# Patient Record
Sex: FEMALE | Race: WHITE | NOT HISPANIC OR LATINO | Employment: OTHER | URBAN - METROPOLITAN AREA
[De-identification: names, ages, dates, MRNs, and addresses within clinical notes are randomized per-mention and may not be internally consistent; named-entity substitution may affect disease eponyms.]

---

## 2017-04-27 PROBLEM — G89.29 CHRONIC NECK PAIN: Status: ACTIVE | Noted: 2017-04-27

## 2017-04-27 PROBLEM — M54.2 CHRONIC NECK PAIN: Status: ACTIVE | Noted: 2017-04-27

## 2017-07-19 ENCOUNTER — CLINICAL SUPPORT (OUTPATIENT)
Dept: SMOKING CESSATION | Facility: CLINIC | Age: 62
End: 2017-07-19
Payer: COMMERCIAL

## 2017-07-19 DIAGNOSIS — F17.200 NICOTINE DEPENDENCE: Primary | ICD-10-CM

## 2017-07-19 PROCEDURE — 99407 BEHAV CHNG SMOKING > 10 MIN: CPT | Mod: S$GLB,,,

## 2019-02-14 PROBLEM — R93.89 ABNORMAL MRI: Status: ACTIVE | Noted: 2019-02-14

## 2019-08-23 PROBLEM — M25.619 LIMITED RANGE OF MOTION (ROM) OF SHOULDER: Status: ACTIVE | Noted: 2019-08-23

## 2019-08-23 PROBLEM — R20.2 PARESTHESIA OF LEFT ARM: Status: ACTIVE | Noted: 2019-08-23

## 2019-08-23 PROBLEM — M79.2 RADICULAR PAIN IN LEFT ARM: Status: ACTIVE | Noted: 2019-08-23

## 2019-08-23 PROBLEM — R29.3 ABNORMAL POSTURE: Status: ACTIVE | Noted: 2019-08-23

## 2019-08-23 PROBLEM — M53.82 DECREASED ROM OF INTERVERTEBRAL DISCS OF CERVICAL SPINE: Status: ACTIVE | Noted: 2019-08-23

## 2019-08-23 PROBLEM — Z74.09 IMPAIRED MOBILITY AND ADLS: Status: ACTIVE | Noted: 2019-08-23

## 2019-08-23 PROBLEM — M62.81 MUSCLE WEAKNESS: Status: ACTIVE | Noted: 2019-08-23

## 2019-08-23 PROBLEM — M25.522 ELBOW PAIN, CHRONIC, LEFT: Status: ACTIVE | Noted: 2019-08-23

## 2019-08-23 PROBLEM — G89.29 ELBOW PAIN, CHRONIC, LEFT: Status: ACTIVE | Noted: 2019-08-23

## 2019-08-23 PROBLEM — Z78.9 IMPAIRED MOBILITY AND ADLS: Status: ACTIVE | Noted: 2019-08-23

## 2021-05-06 ENCOUNTER — PATIENT MESSAGE (OUTPATIENT)
Dept: RESEARCH | Facility: HOSPITAL | Age: 66
End: 2021-05-06

## 2024-01-09 PROBLEM — M54.12 CERVICAL RADICULOPATHY: Status: ACTIVE | Noted: 2024-01-09

## 2024-04-29 PROBLEM — R13.12 OROPHARYNGEAL DYSPHAGIA: Status: ACTIVE | Noted: 2024-04-29

## 2024-05-02 PROBLEM — F41.9 ANXIETY: Status: ACTIVE | Noted: 2024-05-02

## 2024-05-02 PROBLEM — R03.0 ELEVATED BLOOD PRESSURE READING: Status: ACTIVE | Noted: 2024-05-02

## 2024-05-14 PROBLEM — I10 PRIMARY HYPERTENSION: Chronic | Status: ACTIVE | Noted: 2024-05-14

## 2024-05-15 PROBLEM — H53.2 DIPLOPIA: Status: ACTIVE | Noted: 2024-05-15

## 2024-05-15 PROBLEM — I63.9 CVA (CEREBRAL VASCULAR ACCIDENT): Status: ACTIVE | Noted: 2024-05-15

## 2024-07-25 ENCOUNTER — HOSPITAL ENCOUNTER (EMERGENCY)
Facility: HOSPITAL | Age: 69
Discharge: HOME | End: 2024-07-25
Attending: EMERGENCY MEDICINE
Payer: MEDICARE

## 2024-07-25 VITALS
BODY MASS INDEX: 20.66 KG/M2 | HEART RATE: 62 BPM | OXYGEN SATURATION: 98 % | WEIGHT: 124 LBS | SYSTOLIC BLOOD PRESSURE: 162 MMHG | DIASTOLIC BLOOD PRESSURE: 94 MMHG | TEMPERATURE: 98 F | RESPIRATION RATE: 14 BRPM | HEIGHT: 65 IN

## 2024-07-25 DIAGNOSIS — Z86.73 HISTORY OF CEREBELLAR STROKE: ICD-10-CM

## 2024-07-25 DIAGNOSIS — R42 SPELL OF DIZZINESS: Primary | ICD-10-CM

## 2024-07-25 LAB
ALBUMIN SERPL BCP-MCNC: 3.9 G/DL (ref 3.5–5.2)
ALP SERPL-CCNC: 132 U/L (ref 55–135)
ALT SERPL W/O P-5'-P-CCNC: 29 U/L (ref 10–44)
ANION GAP SERPL CALC-SCNC: 9 MMOL/L (ref 3–11)
AST SERPL-CCNC: 25 U/L (ref 10–40)
BASOPHILS # BLD AUTO: 0.07 K/UL (ref 0–0.2)
BASOPHILS NFR BLD: 1.2 % (ref 0–1.9)
BILIRUB SERPL-MCNC: 0.5 MG/DL (ref 0.1–1)
BUN SERPL-MCNC: 14 MG/DL (ref 8–23)
CALCIUM SERPL-MCNC: 9.6 MG/DL (ref 8.7–10.5)
CHLORIDE SERPL-SCNC: 103 MMOL/L (ref 95–110)
CHOLEST SERPL-MCNC: 159 MG/DL (ref 120–199)
CHOLEST/HDLC SERPL: 2.2 {RATIO} (ref 2–5)
CO2 SERPL-SCNC: 26 MMOL/L (ref 23–29)
CREAT SERPL-MCNC: 1 MG/DL (ref 0.5–1.4)
DIFFERENTIAL METHOD BLD: ABNORMAL
EOSINOPHIL # BLD AUTO: 0.1 K/UL (ref 0–0.5)
EOSINOPHIL NFR BLD: 1.6 % (ref 0–8)
ERYTHROCYTE [DISTWIDTH] IN BLOOD BY AUTOMATED COUNT: 14.3 % (ref 11.5–14.5)
EST. GFR  (NO RACE VARIABLE): >60 ML/MIN/1.73 M^2
GLUCOSE SERPL-MCNC: 106 MG/DL (ref 70–110)
GLUCOSE SERPL-MCNC: 136 MG/DL (ref 70–110)
HCT VFR BLD AUTO: 38.5 % (ref 37–48.5)
HDLC SERPL-MCNC: 73 MG/DL (ref 40–75)
HDLC SERPL: 45.9 % (ref 20–50)
HGB BLD-MCNC: 13.1 G/DL (ref 12–16)
IMM GRANULOCYTES # BLD AUTO: 0.01 K/UL (ref 0–0.04)
IMM GRANULOCYTES NFR BLD AUTO: 0.2 % (ref 0–0.5)
LDLC SERPL CALC-MCNC: 71.8 MG/DL (ref 63–159)
LYMPHOCYTES # BLD AUTO: 2.3 K/UL (ref 1–4.8)
LYMPHOCYTES NFR BLD: 39.9 % (ref 18–48)
MCH RBC QN AUTO: 30.8 PG (ref 27–31)
MCHC RBC AUTO-ENTMCNC: 34 G/DL (ref 32–36)
MCV RBC AUTO: 91 FL (ref 82–98)
MONOCYTES # BLD AUTO: 0.6 K/UL (ref 0.3–1)
MONOCYTES NFR BLD: 10.4 % (ref 4–15)
NEUTROPHILS # BLD AUTO: 2.6 K/UL (ref 1.8–7.7)
NEUTROPHILS NFR BLD: 46.7 % (ref 38–73)
NONHDLC SERPL-MCNC: 86 MG/DL
NRBC BLD-RTO: 0 /100 WBC
OHS QRS DURATION: 82 MS
OHS QTC CALCULATION: 416 MS
PLATELET # BLD AUTO: 435 K/UL (ref 150–450)
PMV BLD AUTO: 8.9 FL (ref 9.2–12.9)
POCT GLUCOSE: 136 MG/DL (ref 70–110)
POTASSIUM SERPL-SCNC: 4.2 MMOL/L (ref 3.5–5.1)
PROT SERPL-MCNC: 7.9 G/DL (ref 6–8.4)
RBC # BLD AUTO: 4.25 M/UL (ref 4–5.4)
SODIUM SERPL-SCNC: 138 MMOL/L (ref 136–145)
TRIGL SERPL-MCNC: 71 MG/DL (ref 30–150)
TSH SERPL DL<=0.005 MIU/L-ACNC: 1.92 UIU/ML (ref 0.4–4)
WBC # BLD AUTO: 5.66 K/UL (ref 3.9–12.7)

## 2024-07-25 PROCEDURE — 80061 LIPID PANEL: CPT | Performed by: EMERGENCY MEDICINE

## 2024-07-25 PROCEDURE — 82962 GLUCOSE BLOOD TEST: CPT

## 2024-07-25 PROCEDURE — 99285 EMERGENCY DEPT VISIT HI MDM: CPT | Mod: 25

## 2024-07-25 PROCEDURE — 80053 COMPREHEN METABOLIC PANEL: CPT | Performed by: EMERGENCY MEDICINE

## 2024-07-25 PROCEDURE — 93010 ELECTROCARDIOGRAM REPORT: CPT | Mod: ,,, | Performed by: INTERNAL MEDICINE

## 2024-07-25 PROCEDURE — 36415 COLL VENOUS BLD VENIPUNCTURE: CPT | Performed by: EMERGENCY MEDICINE

## 2024-07-25 PROCEDURE — 93005 ELECTROCARDIOGRAM TRACING: CPT

## 2024-07-25 PROCEDURE — 85025 COMPLETE CBC W/AUTO DIFF WBC: CPT | Performed by: EMERGENCY MEDICINE

## 2024-07-25 PROCEDURE — 84443 ASSAY THYROID STIM HORMONE: CPT | Performed by: EMERGENCY MEDICINE

## 2024-07-25 NOTE — ED PROVIDER NOTES
EMERGENCY DEPARTMENT HISTORY AND PHYSICAL EXAM     This note is dictated on M*Modal word recognition program.  There are word recognition mistakes and grammatical errors that are occasionally missed on review.     Date: 7/25/2024   Patient Name: Tess Flores       History of Presenting Illness      Chief Complaint   Patient presents with    Possible Stroke     Pt reports feeling nauseated and dizziness that started 20-25 minutes ago. Pt reports having a previous stroke May 15th and is worried of the same thing. Pt reports also developing weakness since initial symptom onset.        1044   Tess Flores is a 68 y.o. female with PMHX of hypertension, GERD, tobacco use, marijuana use, CVA may 15th 2024, left vertebral artery stenosis who presents to the emergency department C/O dizziness.    Patient reports she was smoking a joint when she became suddenly dizzy.  Described as having to lay down, room spinning, associated nauseous this.  She reports difficulty ambulating.  She states these are similar symptoms to what prompted her to go to hospital in May of this year and was diagnosed with small cerebellar infarcts.  At time of evaluation patient reports symptoms have significantly improved.  She was not think it was related to marijuana use as she has been smoking marijuana for 60 years    PCP: Alicia Snider, NP        No current facility-administered medications for this encounter.     Current Outpatient Medications   Medication Sig Dispense Refill    albuterol (PROVENTIL/VENTOLIN HFA) 90 mcg/actuation inhaler INHALE 1 TO 2 PUFFS BY MOUTH EVERY 6 HOURS AS NEEDED FOR WHEEZING OR SHORTNESS OF BREATH 18 g 3    aspirin (ECOTRIN) 81 MG EC tablet Take 1 tablet (81 mg total) by mouth once daily. 30 tablet 11    atorvastatin (LIPITOR) 40 MG tablet Take 1 tablet (40 mg total) by mouth every evening. 90 tablet 3    losartan (COZAAR) 25 MG tablet Take 1 tablet (25 mg total) by mouth once daily. 30 tablet 2     pantoprazole (PROTONIX) 40 MG tablet TAKE 1 TABLET(40 MG) BY MOUTH DAILY 30 tablet 0    baclofen (LIORESAL) 10 MG tablet Take 1 tablet (10 mg total) by mouth 3 (three) times daily. 90 tablet 11    budesonide-glycopyr-formoterol (BREZTRI AEROSPHERE) 160-9-4.8 mcg/actuation HFAA Inhale 1 puff into the lungs 2 (two) times a day. 10.7 g 5    clopidogreL (PLAVIX) 75 mg tablet Take 1 tablet (75 mg total) by mouth once daily. 30 tablet 11    diphenhydrAMINE (BENADRYL) 25 mg capsule Take 25 mg by mouth as needed for Itching.      HYDROcodone-acetaminophen (NORCO) 7.5-325 mg per tablet Take 1 tablet by mouth every 8 (eight) hours as needed for Pain.      meclizine (ANTIVERT) 25 mg tablet Take 1 tablet (25 mg total) by mouth 3 (three) times daily as needed for Dizziness. 90 tablet 0    ondansetron (ZOFRAN-ODT) 4 MG TbDL Take 4 mg by mouth every 8 (eight) hours as needed.             Past History     Past Medical History:   Past Medical History:   Diagnosis Date    Arthritis     Bursitis     Bilateral Elbows    Cerebral aneurysm     Doctors not concerned not being followed    Chronic bronchitis     COPD (chronic obstructive pulmonary disease)     CVA (cerebral vascular accident) 05/2024    Multiple    Degenerative disorder of bone     Depression     Fibromyalgia     Gastric ulcer     GERD (gastroesophageal reflux disease)     Gout     H. pylori infection     Hypertension     IBS (irritable bowel syndrome)     IBS (irritable bowel syndrome)     Kidney disease     Right Kidney Removed because not working    Osteoarthritis     Osteopenia     PKD (polycystic kidney disease)     Rheumatoid arthritis     Sciatica of right side     Urethra, diverticulum     Removed        Past Surgical History:   Past Surgical History:   Procedure Laterality Date    ANTERIOR CERVICAL DISCECTOMY W/ FUSION N/A 4/30/2024    Procedure: DISCECTOMY, SPINE, CERVICAL, ANTERIOR APPROACH, WITH FUSION, C3-4 AND C6-7 W/ C3-7 INSTRUMENTATION;  Surgeon: Yazan  "Jeancarlos KITCHEN Jr., MD;  Location: [facility] OR;  Service: Orthopedics;  Laterality: N/A;  STAGE 1: DEPUY    APPENDECTOMY      FUSION OF CERVICAL SPINE BY POSTERIOR APPROACH N/A 2024    Procedure: FUSION, SPINE, CERVICAL, POSTERIOR APPROACH, C3-7 W/ FACET SHIMS;  Surgeon: Jeancarlos Hand Jr., MD;  Location: [facility] OR;  Service: Orthopedics;  Laterality: N/A;  STAGE 2: DEPUY AND PROVIDENCE (FACET SHIMS)    FUSION, SPINE, CERVICAL, ANTERIOR APPROACH      HYSTERECTOMY      DI/BSO secondary to GC infection    NECK EXPLORATION      Fusion    NEPHRECTOMY Right 1992    RE-EXPLORATION N/A 2024    Procedure: RE-EXPLORATION FUSION;  Surgeon: Jeancarlos Hand Jr., MD;  Location: [facility] OR;  Service: Orthopedics;  Laterality: N/A;    REMOVAL OF HARDWARE FROM SPINE N/A 2024    Procedure: REMOVAL, HARDWARE, SPINE, C4-5 AND C5-6;  Surgeon: Jeancarlos Hand Jr., MD;  Location: [facility] OR;  Service: Orthopedics;  Laterality: N/A;        Family History:   Family History   Problem Relation Name Age of Onset    Heart defect Mother      Cancer Mother          Lung CA    Heart disease Father      Schizophrenia Father      Heart disease Sister 4     Cancer Sister 4         lung, breast    Cancer Sister 1         colon    Cancer Brother 2         liver    Hypertension Brother 6     Hyperlipidemia Brother 6     Heart failure Brother 6     Stroke Brother 6     Heart attack Brother 6         Social History:   Social History     Tobacco Use    Smoking status: Former     Current packs/day: 0.00     Average packs/day: 1 pack/day for 52.4 years (52.4 ttl pk-yrs)     Types: Cigarettes     Start date:      Quit date: 2024     Years since quittin.1     Passive exposure: Past    Smokeless tobacco: Never    Tobacco comments:     Cigarette less than a pack a day . 3 "joints per day"          Pt quit smoking cigarettes and marijuana  24.    Substance Use Topics    Alcohol use: Yes     Alcohol/week: 2.0 standard " "drinks of alcohol     Types: 2 Cans of beer per week     Comment: occasionally    Drug use: Yes     Frequency: 5.0 times per week     Types: Marijuana     Comment: Quit smoking marijuana 05/2024        Allergies:   Review of patient's allergies indicates:   Allergen Reactions    Niacin preparations Hives    Asa [aspirin] Other (See Comments)    Cymbalta [duloxetine] Other (See Comments)     Kidney problems and pt doesn't want to be on this med again    Effexor [venlafaxine] Itching    Lyrica [pregabalin] Other (See Comments)     Combative, unreasonable      Ultram [tramadol] Hives    Vimpat [lacosamide]     Codeine sulfate Itching    Motrin [ibuprofen] Nausea Only    Nsaids (non-steroidal anti-inflammatory drug) Nausea Only    Pcn [penicillins] Rash          Review of Systems   Review of Systems   See HPI for pertinent positives and negatives       Physical Exam     Vitals:    07/25/24 1029 07/25/24 1034 07/25/24 1147   BP: (!) 187/90  (!) 162/94   Pulse: 81  62   Resp: 14  14   Temp:  98 °F (36.7 °C)    SpO2: 98%  98%   Weight: 56.2 kg (124 lb)     Height: 5' 5" (1.651 m)        Physical Exam  Vitals and nursing note reviewed.   Constitutional:       General: She is not in acute distress.     Appearance: Normal appearance. She is not ill-appearing.   HENT:      Head: Normocephalic and atraumatic.      Right Ear: External ear normal.      Left Ear: External ear normal.      Nose: Nose normal. No congestion or rhinorrhea.      Mouth/Throat:      Mouth: Mucous membranes are moist.   Eyes:      Conjunctiva/sclera: Conjunctivae normal.      Pupils: Pupils are equal, round, and reactive to light.   Cardiovascular:      Rate and Rhythm: Normal rate and regular rhythm.   Pulmonary:      Effort: Pulmonary effort is normal. No respiratory distress.   Musculoskeletal:         General: No deformity. Normal range of motion.      Cervical back: Normal range of motion. No rigidity.   Skin:     General: Skin is dry. "   Neurological:      General: No focal deficit present.      Mental Status: She is alert and oriented to person, place, and time. Mental status is at baseline.      Cranial Nerves: No cranial nerve deficit.      Sensory: No sensory deficit.      Motor: No weakness.      Coordination: Coordination normal. Finger-Nose-Finger Test and Heel to Shin Test normal. Rapid alternating movements normal.      Comments: No nystagmus,   Psychiatric:         Mood and Affect: Mood normal.         Behavior: Behavior normal.              Diagnostic Study Results      Labs -   Recent Results (from the past 12 hour(s))   CBC W/ AUTO DIFFERENTIAL    Collection Time: 07/25/24 10:46 AM   Result Value Ref Range    WBC 5.66 3.90 - 12.70 K/uL    RBC 4.25 4.00 - 5.40 M/uL    Hemoglobin 13.1 12.0 - 16.0 g/dL    Hematocrit 38.5 37.0 - 48.5 %    MCV 91 82 - 98 fL    MCH 30.8 27.0 - 31.0 pg    MCHC 34.0 32.0 - 36.0 g/dL    RDW 14.3 11.5 - 14.5 %    Platelets 435 150 - 450 K/uL    MPV 8.9 (L) 9.2 - 12.9 fL    Immature Granulocytes 0.2 0.0 - 0.5 %    Gran # (ANC) 2.6 1.8 - 7.7 K/uL    Immature Grans (Abs) 0.01 0.00 - 0.04 K/uL    Lymph # 2.3 1.0 - 4.8 K/uL    Mono # 0.6 0.3 - 1.0 K/uL    Eos # 0.1 0.0 - 0.5 K/uL    Baso # 0.07 0.00 - 0.20 K/uL    nRBC 0 0 /100 WBC    Gran % 46.7 38.0 - 73.0 %    Lymph % 39.9 18.0 - 48.0 %    Mono % 10.4 4.0 - 15.0 %    Eosinophil % 1.6 0.0 - 8.0 %    Basophil % 1.2 0.0 - 1.9 %    Differential Method Automated    Comprehensive metabolic panel    Collection Time: 07/25/24 10:46 AM   Result Value Ref Range    Sodium 138 136 - 145 mmol/L    Potassium 4.2 3.5 - 5.1 mmol/L    Chloride 103 95 - 110 mmol/L    CO2 26 23 - 29 mmol/L    Glucose 106 70 - 110 mg/dL    BUN 14 8 - 23 mg/dL    Creatinine 1.0 0.5 - 1.4 mg/dL    Calcium 9.6 8.7 - 10.5 mg/dL    Total Protein 7.9 6.0 - 8.4 g/dL    Albumin 3.9 3.5 - 5.2 g/dL    Total Bilirubin 0.5 0.1 - 1.0 mg/dL    Alkaline Phosphatase 132 55 - 135 U/L    AST 25 10 - 40 U/L    ALT 29  10 - 44 U/L    eGFR >60.0 >60 mL/min/1.73 m^2    Anion Gap 9 3 - 11 mmol/L   TSH    Collection Time: 07/25/24 10:46 AM   Result Value Ref Range    TSH 1.920 0.400 - 4.000 uIU/mL   LDL - Lipid Panel    Collection Time: 07/25/24 10:46 AM   Result Value Ref Range    Cholesterol 159 120 - 199 mg/dL    Triglycerides 71 30 - 150 mg/dL    HDL 73 40 - 75 mg/dL    LDL Cholesterol 71.8 63.0 - 159.0 mg/dL    HDL/Cholesterol Ratio 45.9 20.0 - 50.0 %    Total Cholesterol/HDL Ratio 2.2 2.0 - 5.0    Non-HDL Cholesterol 86 mg/dL   POCT glucose    Collection Time: 07/25/24 10:52 AM   Result Value Ref Range    POC Glucose 136 (A) 70 - 110 MG/DL        Radiologic Studies -    CT Head Without Contrast   Final Result      No CT evidence of an acute intracranial abnormality.         Electronically signed by: Presley Wall MD   Date:    07/25/2024   Time:    11:31           Medications given in the ED-   Medications - No data to display        Medical Decision Making    I am the first provider for this patient.     I reviewed the vital signs, available nursing notes, past medical history, past surgical history, family history and social history.     Vital Signs:  Reviewed the patient's vital signs.     Pulse Oximetry Analysis and Interpretation:    98% on Room Air, normal        EKG Interpretation: (Per my independent interpretation, pending formal read)   In normal sinus rhythm rate of 65 normal intervals normal axis normal EKG terpreted by Jaya Yan MD at 1047         External Test Results (Pertinent to encounter):    5/14/2024 MRI Brain  EXAMINATION:  MRI BRAIN WITHOUT CONTRAST     CLINICAL HISTORY:  Dizziness, persistent/recurrent, cardiac or vascular cause suspected; Dizziness and giddiness     TECHNIQUE:  MRI of the brain was performed in multiple planes and sequences.     COMPARISON:  Head CT 05/14/2024.     FINDINGS:  No hydrocephalus.  No mass or mass effect.  A few small areas of restricted diffusion suggesting acute  lacunar infarcts within the posterior left parietal lobe and both cerebellar hemispheres.  Largest lesion in left cerebellar hemisphere measuring 8 mm.  Scattered areas of increased T2 white matter signal suggesting a chronic microvascular ischemia.  Expected central flow voids visualized.  Pituitary unremarkable.     Impression:     Multiple small acute lacunar infarcts within both cerebellar hemispheres and posterior left parietal lobe.        Electronically signed by:Arun Olmos MD  Date:                                            05/14/2024  Time:                                           18:26    Records Reviewed: Nursing Notes, Old Medical Records, and Previous Radiology Studies    History Obtained By: Patient    Provider Notes: Tess Flores is a 68 y.o. female with dizziness    Co-morbidities Considered:  Prior stroke    Differential Diagnosis:  CVA, TIA, residual deficit from stroke, VBI, marijuana use      ED Course:    Patient presents with dizziness now largely resolved.  Onset of symptoms was about 30 minutes prior to arrival.  Patient would not be a tenecteplase candidate given symptoms resolving at this time as well as prior stroke within the past 3 months and therefore stroke alert not called    11:53 AM  Patient remains asymptomatic, pleasant, no acute distress.  Ambulatory in emergency department without difficulty.  Patient has been shopping on her cell phone and is symptom free.  CT head showed no acute findings.  Labs benign.  Advised close follow up with her neurologist.  Reasons return to ED discussed.         Problems Addressed:  Transient dizziness    Procedures:   Procedures       Diagnosis and Disposition     Critical Care:      DISCHARGE NOTE:       Tess Flores's  results have been reviewed with her.  She has been counseled regarding her diagnosis, treatment, and plan.  She verbally conveys understanding and agreement of the signs, symptoms, diagnosis, treatment and  prognosis and additionally agrees to follow up as discussed.  She also agrees with the care-plan and conveys that all of her questions have been answered.  I have also provided discharge instructions for her that include: educational information regarding their diagnosis and treatment, and list of reasons why they would want to return to the ED prior to their follow-up appointment, should her condition change. She has been provided with education for proper emergency department utilization.         CLINICAL IMPRESSION:         1. Spell of dizziness    2. History of cerebellar stroke              PLAN:   1. Discharge Home  2.      Medication List        ASK your doctor about these medications      albuterol 90 mcg/actuation inhaler  Commonly known as: PROVENTIL/VENTOLIN HFA  INHALE 1 TO 2 PUFFS BY MOUTH EVERY 6 HOURS AS NEEDED FOR WHEEZING OR SHORTNESS OF BREATH     aspirin 81 MG EC tablet  Commonly known as: ECOTRIN  Take 1 tablet (81 mg total) by mouth once daily.     atorvastatin 40 MG tablet  Commonly known as: LIPITOR  Take 1 tablet (40 mg total) by mouth every evening.     baclofen 10 MG tablet  Commonly known as: LIORESAL  Take 1 tablet (10 mg total) by mouth 3 (three) times daily.     BREZTRI AEROSPHERE 160-9-4.8 mcg/actuation Hfaa  Generic drug: budesonide-glycopyr-formoterol  Inhale 1 puff into the lungs 2 (two) times a day.     clopidogreL 75 mg tablet  Commonly known as: PLAVIX  Take 1 tablet (75 mg total) by mouth once daily.     diphenhydrAMINE 25 mg capsule  Commonly known as: BENADRYL     HYDROcodone-acetaminophen 7.5-325 mg per tablet  Commonly known as: NORCO     losartan 25 MG tablet  Commonly known as: COZAAR  Take 1 tablet (25 mg total) by mouth once daily.     meclizine 25 mg tablet  Commonly known as: ANTIVERT  Take 1 tablet (25 mg total) by mouth 3 (three) times daily as needed for Dizziness.     ondansetron 4 MG Tbdl  Commonly known as: ZOFRAN-ODT     pantoprazole 40 MG tablet  Commonly known  as: PROTONIX  TAKE 1 TABLET(40 MG) BY MOUTH DAILY             3. Alicia Snider, RICARDO  [address] [facility] [address] [phone]    Schedule an appointment as soon as possible for a visit   Primary care follow up    [facility] Emergency Department  [address] [place] [id number] [phone]  Go to   If symptoms worsen       _______________________________     Please note that this dictation was completed with [employer], the computer voice recognition software.  Quite often unanticipated grammatical, syntax, homophones, and other interpretive errors are inadvertently transcribed by the computer software.  Please disregard these errors.  Please excuse any errors that have escaped final proofreading.             Jaya Yan MD  07/25/24 1778

## 2024-08-19 PROBLEM — I63.9 CVA (CEREBRAL VASCULAR ACCIDENT): Status: RESOLVED | Noted: 2024-05-15 | Resolved: 2024-08-19

## 2024-09-03 PROBLEM — G89.29 ELBOW PAIN, CHRONIC, LEFT: Status: RESOLVED | Noted: 2019-08-23 | Resolved: 2024-09-03

## 2024-09-03 PROBLEM — R20.2 PARESTHESIA OF LEFT ARM: Status: RESOLVED | Noted: 2019-08-23 | Resolved: 2024-09-03

## 2024-09-03 PROBLEM — M62.81 MUSCLE WEAKNESS: Status: RESOLVED | Noted: 2019-08-23 | Resolved: 2024-09-03

## 2024-09-03 PROBLEM — Z74.09 IMPAIRED MOBILITY AND ADLS: Status: RESOLVED | Noted: 2019-08-23 | Resolved: 2024-09-03

## 2024-09-03 PROBLEM — M53.82 DECREASED ROM OF INTERVERTEBRAL DISCS OF CERVICAL SPINE: Status: RESOLVED | Noted: 2019-08-23 | Resolved: 2024-09-03

## 2024-09-03 PROBLEM — M25.619 LIMITED RANGE OF MOTION (ROM) OF SHOULDER: Status: RESOLVED | Noted: 2019-08-23 | Resolved: 2024-09-03

## 2024-09-03 PROBLEM — M25.522 ELBOW PAIN, CHRONIC, LEFT: Status: RESOLVED | Noted: 2019-08-23 | Resolved: 2024-09-03

## 2024-09-03 PROBLEM — R29.3 ABNORMAL POSTURE: Status: RESOLVED | Noted: 2019-08-23 | Resolved: 2024-09-03

## 2024-09-03 PROBLEM — M79.2 RADICULAR PAIN IN LEFT ARM: Status: RESOLVED | Noted: 2019-08-23 | Resolved: 2024-09-03

## 2024-09-03 PROBLEM — Z78.9 IMPAIRED MOBILITY AND ADLS: Status: RESOLVED | Noted: 2019-08-23 | Resolved: 2024-09-03
